# Patient Record
Sex: MALE | Race: WHITE | NOT HISPANIC OR LATINO | Employment: FULL TIME | URBAN - METROPOLITAN AREA
[De-identification: names, ages, dates, MRNs, and addresses within clinical notes are randomized per-mention and may not be internally consistent; named-entity substitution may affect disease eponyms.]

---

## 2024-05-13 ENCOUNTER — HOSPITAL ENCOUNTER (EMERGENCY)
Facility: HOSPITAL | Age: 49
Discharge: HOME | End: 2024-05-13
Attending: EMERGENCY MEDICINE | Admitting: EMERGENCY MEDICINE
Payer: COMMERCIAL

## 2024-05-13 ENCOUNTER — APPOINTMENT (OUTPATIENT)
Dept: GENERAL RADIOLOGY | Facility: HOSPITAL | Age: 49
End: 2024-05-13
Payer: COMMERCIAL

## 2024-05-13 VITALS
DIASTOLIC BLOOD PRESSURE: 76 MMHG | BODY MASS INDEX: 25.21 KG/M2 | HEART RATE: 68 BPM | RESPIRATION RATE: 16 BRPM | HEIGHT: 76 IN | WEIGHT: 207 LBS | TEMPERATURE: 98.1 F | OXYGEN SATURATION: 98 % | SYSTOLIC BLOOD PRESSURE: 133 MMHG

## 2024-05-13 DIAGNOSIS — S62.347A CLOSED NONDISPLACED FRACTURE OF BASE OF FIFTH METACARPAL BONE OF LEFT HAND, INITIAL ENCOUNTER: Primary | ICD-10-CM

## 2024-05-13 PROCEDURE — 99283 EMERGENCY DEPT VISIT LOW MDM: CPT

## 2024-05-13 PROCEDURE — 73120 X-RAY EXAM OF HAND: CPT

## 2024-05-13 RX ORDER — HYDROCODONE BITARTRATE AND ACETAMINOPHEN 7.5; 325 MG/1; MG/1
1 TABLET ORAL EVERY 4 HOURS PRN
Qty: 15 TABLET | Refills: 0 | Status: SHIPPED | OUTPATIENT
Start: 2024-05-13

## 2024-05-13 RX ORDER — OXYCODONE HYDROCHLORIDE AND ACETAMINOPHEN 5; 325 MG/1; MG/1
1 TABLET ORAL ONCE
Status: COMPLETED | OUTPATIENT
Start: 2024-05-13 | End: 2024-05-13

## 2024-05-13 RX ADMIN — OXYCODONE HYDROCHLORIDE AND ACETAMINOPHEN 1 TABLET: 5; 325 TABLET ORAL at 20:16

## 2024-05-13 NOTE — Clinical Note
[facility] EMERGENCY ROOM  [address] [place] [address]  Phone: [phone]  Fax: [phone]    Baron Moody was seen and treated in our emergency department on 5/13/2024.  He may return to work on 05/16/2024.         Thank you for choosing [facility].    Cris Shirley APRN

## 2024-05-14 ENCOUNTER — TELEPHONE (OUTPATIENT)
Dept: ORTHOPEDIC SURGERY | Facility: CLINIC | Age: 49
End: 2024-05-14
Payer: COMMERCIAL

## 2024-05-14 NOTE — ED PROVIDER NOTES
Time: 8:06 PM EDT  Date of encounter:  5/13/2024  Independent Historian/Clinical History and Information was obtained by:   Patient    History is limited by: N/A    Chief Complaint: LEFT 5TH FINGER INJURY      History of Present Illness:    The patient presents to the emergency department and states that on February 10 he was seen at [facility] after he states that he punched a telephone pole with his left hand.  He states that he is complaining of pain in his fifth metatarsal area and had negative x-ray at [facility] and was told to have it x-rayed again in 7 to 10 days.  He is here today for repeat x-ray of his left hand.  He states that the pain is continued but the swelling has gotten better.  He does have a Ortho-Glass splint in place.  He is neurovascular intact.  He can flex and extend the fingers but does report increased pain with movement of the fifth finger.  He denies any previous injury and states that he is right-handed.      History provided by:  Patient   used: No        Patient Care Team  Primary Care Provider: Provider, No Known    Past Medical History:     No Known Allergies  History reviewed. No pertinent past medical history.  History reviewed. No pertinent surgical history.  History reviewed. No pertinent family history.    Home Medications:  Prior to Admission medications    Not on File        Social History:   Social History     Tobacco Use    Smoking status: Every Day     Current packs/day: 1.00     Types: Cigarettes    Smokeless tobacco: Never   Vaping Use    Vaping status: Never Used   Substance Use Topics    Drug use: Defer         Review of Systems:  Review of Systems   Constitutional:  Negative for chills and fever.   HENT:  Negative for congestion, ear pain and sore throat.    Eyes:  Negative for pain.   Respiratory:  Negative for cough, chest tightness and shortness of breath.    Cardiovascular:  Negative for chest pain.   Gastrointestinal:  Negative for  "abdominal pain, diarrhea, nausea and vomiting.   Genitourinary:  Negative for flank pain and hematuria.   Musculoskeletal:  Positive for arthralgias, joint swelling and myalgias. Negative for back pain, gait problem, neck pain and neck stiffness.   Skin:  Negative for pallor and rash.   Neurological:  Negative for seizures and headaches.   All other systems reviewed and are negative.       Physical Exam:  /76 (BP Location: Right arm, Patient Position: Sitting)   Pulse 68   Temp 98.1 °F (36.7 °C) (Oral)   Resp 16   Ht 193 cm (76\")   Wt 93.9 kg (207 lb)   SpO2 98%   BMI 25.20 kg/m²     Physical Exam  Vitals and nursing note reviewed.   Constitutional:       General: He is not in acute distress.     Appearance: Normal appearance. He is not ill-appearing or toxic-appearing.   HENT:      Head: Normocephalic and atraumatic.      Mouth/Throat:      Mouth: Mucous membranes are moist.   Eyes:      General: No scleral icterus.     Pupils: Pupils are equal, round, and reactive to light.   Cardiovascular:      Rate and Rhythm: Normal rate and regular rhythm.      Pulses: Normal pulses.   Pulmonary:      Effort: Pulmonary effort is normal. No respiratory distress.   Abdominal:      General: Abdomen is flat.   Musculoskeletal:         General: Swelling, tenderness and signs of injury present. Normal range of motion.      Cervical back: Normal range of motion.   Skin:     General: Skin is warm and dry.      Capillary Refill: Capillary refill takes less than 2 seconds.      Findings: No erythema or rash.   Neurological:      General: No focal deficit present.      Mental Status: He is alert and oriented to person, place, and time. Mental status is at baseline.   Psychiatric:         Mood and Affect: Mood normal.         Behavior: Behavior normal.                Procedures:  Procedures      Medical Decision Making:      Comorbidities that affect care:    None    External Notes reviewed:    None      The following orders " were placed and all results were independently analyzed by me:  Orders Placed This Encounter   Procedures    XR Hand 2 View Left    Trim cast padding to comfort  Nursing Communication    Obtain & Apply The Following- Upper extremity; Sling       Medications Given in the Emergency Department:  Medications   oxyCODONE-acetaminophen (PERCOCET) 5-325 MG per tablet 1 tablet (1 tablet Oral Given 5/13/24 2016)        ED Course:    ED Course as of 05/14/24 0612   Mon May 13, 2024   1923 XR Hand 3+ View Left  Order: [id number]  Impression    Normal x-ray examination of the hand.    Electronically Signed:  David Dang MD  2024/05/10 at 16:37 CDT  Reading Location ID and State: 994 / [place]  Tel [phone], Service support  [phone], Fax [phone]  Narrative      REPORT-ID:[id number]    STUDY:   X-RAY - LEFT HAND    REASON FOR EXAM:   Male, 48 years old.  HAND INJURY; PUNCHED A TELEPHONE POLE, LEFT HAND PAIN    TECHNIQUE:   3 view(s) of the hand.    COMPARISON:   None.    FINDINGS:  Normal radiocarpal articulation.  Normal distal radioulnar joint.    Normal visualized carpal bones.  Normal carpal articulations    Normal carpometacarpal articulation of the thumb.  Normal second through fifth carpometacarpal joints.    Normal metacarpi.    Normal metacarpophalangeal joint of the thumb.  Normal interphalangeal joint of the thumb.  Normal proximal and distal phalanges of the thumb.    Normal metacarpophalangeal joints of the second through fifth fingers.  Normal proximal and distal interphalangeal joints of the second through fifth fingers.  Normal phalanges of the second through fifth fingers.    The soft tissue structures are unremarkable.  ___________________________________  Exam End: 05/10/24 17:30 Last Resulted: 05/10/24 17:37  Received From: [facility]  Result Received: 05/13/24 18:57      [TC]      ED Course User Index  [TC] Cris Shirley APRN       Labs:    Lab Results (last 24  hours)       ** No results found for the last 24 hours. **             Imaging:    XR Hand 2 View Left    Result Date: 5/13/2024  XR HAND 2 VW LEFT-  Date of Exam: 5/13/2024 6:51 PM  Indication: injury-punched a telephone pole  Comparison: None available.  Findings: Cast material is in place. There is a nondisplaced fracture involving the proximal shaft of the fifth metacarpal. The radiocarpal joint is congruent. The joint spaces appear well-maintained. The soft tissues are unremarkable.      Impression: Nondisplaced fracture involving proximal shaft of fifth metacarpal.   Electronically Signed By-Baron Harding MD On:5/13/2024 7:31 PM         Differential Diagnosis and Discussion:    Extremity Pain: Differential diagnosis includes but is not limited to soft tissue sprain, tendonitis, tendon injury, dislocation, fracture, deep vein thrombosis, arterial insufficiency, osteoarthritis, bursitis, and ligamentous damage.  Joint Pain: Differential diagnosis includes but is not limited to polyarticular arthritis, gout, tendinitis, hemarthrosis, septic arthritis, rheumatoid arthritis, bursitis, degenerative joint disease, joint effusion, autoimmune disorder, trauma, and occult neoplasm.  Orthopedic Injuries: Differential diagnosis includes but is not limited to fractures, soft tissue injuries, dislocations, contusions, ligamentous injuries, tendon injuries, nerve injuries, compartment syndrome, bursitis, and vascular injuries.    All X-rays impressions were independently interpreted by me.    MDM  Number of Diagnoses or Management Options  Closed nondisplaced fracture of base of fifth metacarpal bone of left hand, initial encounter: established and worsening     Amount and/or Complexity of Data Reviewed  Tests in the radiology section of CPT®: reviewed    Risk of Complications, Morbidity, and/or Mortality  Presenting problems: low  Diagnostic procedures: low  Management options: low    Patient Progress  Patient progress:  stable         Patient Care Considerations:    ANTIBIOTICS: I considered prescribing antibiotics as an outpatient however no bacterial focus of infection was found.      Consultants/Shared Management Plan:    None    Social Determinants of Health:    Patient is independent, reliable, and has access to care.       Disposition and Care Coordination:    Discharged: The patient is suitable and stable for discharge with no need for consideration of admission.    I have explained the patient´s condition, diagnoses and treatment plan based on the information available to me at this time. I have answered questions and addressed any concerns. The patient has a good  understanding of the patient´s diagnosis, condition, and treatment plan as can be expected at this point. The vital signs have been stable. The patient´s condition is stable and appropriate for discharge from the emergency department.      The patient will pursue further outpatient evaluation with the primary care physician or other designated or consulting physician as outlined in the discharge instructions. They are agreeable to this plan of care and follow-up instructions have been explained in detail. The patient has received these instructions in written format and has expressed an understanding of the discharge instructions. The patient is aware that any significant change in condition or worsening of symptoms should prompt an immediate return to this or the closest emergency department or call to 911.  I have explained discharge medications and the need for follow up with the patient/caretakers. This was also printed in the discharge instructions. Patient was discharged with the following medications and follow up:      Medication List        New Prescriptions      HYDROcodone-acetaminophen 7.5-325 MG per tablet  Commonly known as: NORCO  Take 1 tablet by mouth Every 4 (Four) Hours As Needed for Moderate Pain.               Where to Get Your Medications         These medications were sent to [id number] - [place], [place] - [address] - [phone]  - [phone] FX  [address] [address]      Phone: [phone]   HYDROcodone-acetaminophen 7.5-325 MG per tablet      Miguel Ash MD  [address] [place], [place] [id number] [phone]    Call   FOR FOLLOW UP       Final diagnoses:   Closed nondisplaced fracture of base of fifth metacarpal bone of left hand, initial encounter        ED Disposition       ED Disposition   Discharge    Condition   Stable    Comment   --               This medical record created using voice recognition software.             Cris Shirley, APRN  05/14/24 0612

## 2024-05-14 NOTE — DISCHARGE INSTRUCTIONS
Rest, ice, elevate. Take meds as prescribed. You may take OTC motrin with this medication as needed for pain. Wear your splint for stability, wear your sling for elevation. Call Dr Ash's office tomorrow and follow up with him in 1-2 days. Return to the ed for any increase in swelling, discoloration, or any new/worse concerns.

## 2024-05-20 ENCOUNTER — OFFICE VISIT (OUTPATIENT)
Dept: ORTHOPEDIC SURGERY | Facility: CLINIC | Age: 49
End: 2024-05-20
Payer: COMMERCIAL

## 2024-05-20 VITALS
HEIGHT: 76 IN | HEART RATE: 99 BPM | BODY MASS INDEX: 25.21 KG/M2 | OXYGEN SATURATION: 94 % | SYSTOLIC BLOOD PRESSURE: 121 MMHG | DIASTOLIC BLOOD PRESSURE: 80 MMHG | WEIGHT: 207 LBS

## 2024-05-20 DIAGNOSIS — S62.307A CLOSED NONDISPLACED FRACTURE OF FIFTH METACARPAL BONE OF LEFT HAND, UNSPECIFIED PORTION OF METACARPAL, INITIAL ENCOUNTER: Primary | ICD-10-CM

## 2024-05-20 NOTE — PROGRESS NOTES
"Chief Complaint  Pain and Initial Evaluation of the Left Hand    Subjective          Baron Moody presents to [facility] for an evaluation of his left hand.     History of Present Illness    The patient presents here today for an evaluation of his left hand. Patient was seen in the emergency room on 05/13/2024 where he had x-rays done. He reports he punched a telephone pole. He was placed in a splint from the ER and this was removed today in the office. He is left hand dominant.     No Known Allergies     Social History     Socioeconomic History    Marital status:    Tobacco Use    Smoking status: Every Day     Current packs/day: 1.00     Types: Cigarettes    Smokeless tobacco: Never   Vaping Use    Vaping status: Never Used   Substance and Sexual Activity    Drug use: Defer    Sexual activity: Defer        I reviewed the patient's chief complaint, history of present illness, review of systems, past medical history, surgical history, family history, social history, medications, and allergy list.     REVIEW OF SYSTEMS    Constitutional: Denies fevers, chills, weight loss  Cardiovascular: Denies chest pain, shortness of breath  Skin: Denies rashes, acute skin changes  Neurologic: Denies headache, loss of consciousness  MSK: left hand pain       Objective   Vital Signs:   /80   Pulse 99   Ht 193 cm (76\")   Wt 93.9 kg (207 lb)   SpO2 94%   BMI 25.20 kg/m²     Body mass index is 25.2 kg/m².    Physical Exam    General: Alert. No acute distress.   Left upper extremity:  short arm splint was removed, tender over the base of the 5th, healing abrasion, no rotational deformity, neurovascularly intact     Orthopedic Injury Treatment    Date/Time: 5/20/2024 2:25 PM    Performed by: Miguel Ash MD  Authorized by: Miguel Ash MD  Injury location: hand  Location details: left hand  Immobilization: cast  Splint type: ulnar gutter  Supplies used: cotton padding " (fiberglass)  Patient tolerance: patient tolerated the procedure well with no immediate complications  Comments: Closed treatment was obtained and fiberglass cast was applied.  The patient tolerated the procedure without any complications.  Daiana L        Imaging Results (Most Recent)       None                     Assessment and Plan        XR Hand 2 View Left    Result Date: 5/13/2024  Narrative: XR HAND 2 VW LEFT-  Date of Exam: 5/13/2024 6:51 PM  Indication: injury-punched a telephone pole  Comparison: None available.  Findings: Cast material is in place. There is a nondisplaced fracture involving the proximal shaft of the fifth metacarpal. The radiocarpal joint is congruent. The joint spaces appear well-maintained. The soft tissues are unremarkable.      Impression: Impression: Nondisplaced fracture involving proximal shaft of fifth metacarpal.   Electronically Signed By-Baron Harding MD On:5/13/2024 7:31 PM      XR Hand 3+ View Left    Result Date: 5/10/2024  Narrative: REPORT-ID:[id number] STUDY:   X-RAY - LEFT HAND REASON FOR EXAM:   Male, 48 years old.  HAND INJURY; PUNCHED A TELEPHONE POLE, LEFT HAND PAIN TECHNIQUE:   3 view(s) of the hand. COMPARISON:   None. FINDINGS: Normal radiocarpal articulation.  Normal distal radioulnar joint. Normal visualized carpal bones.  Normal carpal articulations Normal carpometacarpal articulation of the thumb.  Normal second through fifth carpometacarpal joints. Normal metacarpi. Normal metacarpophalangeal joint of the thumb.  Normal interphalangeal joint of the thumb.  Normal proximal and distal phalanges of the thumb. Normal metacarpophalangeal joints of the second through fifth fingers.  Normal proximal and distal interphalangeal joints of the second through fifth fingers.  Normal phalanges of the second through fifth fingers. The soft tissue structures are unremarkable. ___________________________________    Impression: Normal x-ray examination of  the hand. Electronically Signed: David Dang MD 2024/05/10 at 16:37 CDT Reading Location ID and State: 994 / [place] Tel [phone], Service support  [phone], Fax [phone]      Diagnoses and all orders for this visit:    1. Closed nondisplaced fracture of fifth metacarpal bone of left hand, unspecified portion of metacarpal, initial encounter (Primary)        The patient presents here today for an evaluation of his left hand. Xrays that were obtained in the emergency room were reviewed with the patient.     Discussed placing the patient in an ulnar gutter cast today. Cast care was discussed with the patient. Patient expressed understanding and wishes to proceed.      Work note provided.       Will obtain X-Rays of right hand at next visit.     Educated on risk of smoking/nicotine. Discussed options for smoking cessation. and Call or return if worsening symptoms.    Scribed for Miguel Ash MD by Sallie Nixon  05/20/2024   14:08 EDT         Follow Up       1 week     Patient was given instructions and counseling regarding his condition or for health maintenance advice. Please see specific information pulled into the AVS if appropriate.       I have personally performed the services described in this document as scribed by the above individual and it is both accurate and complete. Miguel Ash MD 05/20/24 16:50 EDT

## 2024-05-29 ENCOUNTER — OFFICE VISIT (OUTPATIENT)
Dept: ORTHOPEDIC SURGERY | Facility: CLINIC | Age: 49
End: 2024-05-29
Payer: COMMERCIAL

## 2024-05-29 VITALS — WEIGHT: 207 LBS | HEIGHT: 76 IN | BODY MASS INDEX: 25.21 KG/M2

## 2024-05-29 DIAGNOSIS — M79.642 LEFT HAND PAIN: ICD-10-CM

## 2024-05-29 DIAGNOSIS — S62.307A CLOSED NONDISPLACED FRACTURE OF FIFTH METACARPAL BONE OF LEFT HAND, UNSPECIFIED PORTION OF METACARPAL, INITIAL ENCOUNTER: Primary | ICD-10-CM

## 2024-05-29 PROCEDURE — 99024 POSTOP FOLLOW-UP VISIT: CPT | Performed by: PHYSICIAN ASSISTANT

## 2024-05-29 NOTE — PROGRESS NOTES
"Chief Complaint  Follow-up of the Left Hand    Subjective          Baron Moody presents to [facility]   History of Present Illness    Baron Moody presents today for a follow-up of his left hand.  Patient has a left fifth metacarpal fracture that we are treating conservatively with initial injury 5/13/2024.  Today, patient reports no pain to his hand.  Denies complications with his cast.  Denies numbness or tingling to his fingers.      No Known Allergies     Social History     Socioeconomic History    Marital status:    Tobacco Use    Smoking status: Every Day     Current packs/day: 1.00     Types: Cigarettes    Smokeless tobacco: Never   Vaping Use    Vaping status: Never Used   Substance and Sexual Activity    Drug use: Defer    Sexual activity: Defer        I reviewed the patient's chief complaint, history of present illness, review of systems, past medical history, surgical history, family history, social history, medications, and allergy list.     REVIEW OF SYSTEMS    Constitutional: Denies fevers, chills, weight loss  Cardiovascular: Denies chest pain, shortness of breath  Skin: Denies rashes, acute skin changes  Neurologic: Denies headache, loss of consciousness  MSK: Left hand pain      Objective   Vital Signs:   Ht 193 cm (76\")   Wt 93.9 kg (207 lb)   BMI 25.20 kg/m²     Body mass index is 25.2 kg/m².    Physical Exam    General: Alert. No acute distress.   Left upper extremity: Ulnar gutter cast in place today.  Cast intact, clean, and dry.  Small abrasion to the thumb from cast wear with no active drainage or concerning signs for infection.  Finger range of motion intact.  Sensation intact to the fingers.  Elbow range of motion intact.  Less than 2-second capillary refill.    Procedures    Imaging Results (Most Recent)       Procedure Component Value Units Date/Time    XR Hand 2 View Left [[id number]] Resulted: 05/29/24 0958     Updated: 05/29/24 0958    " Narrative:      Indications: Follow-up left fifth metacarpal fracture    Views: AP and lateral left hand    Findings: Left fifth metacarpal base fracture is seen.  Fracture alignment   appears stable.  All joints appear reduced.  Casting material in place   today.    Comparative Data: Comparative data found and reviewed today.                   Assessment and Plan    Diagnoses and all orders for this visit:    1. Closed nondisplaced fracture of fifth metacarpal bone of left hand, unspecified portion of metacarpal, initial encounter (Primary)    2. Left hand pain  -     XR Hand 2 View Left        Baron Moody presents today to follow-up his left fifth metacarpal fracture that we are treating conservatively in an ulnar gutter cast.  Initial injury 5/13/2024.  X-rays reviewed with the patient today.  Moleskin was applied to the cast today.  Patient will continue ulnar gutter cast for a total of 4 weeks.  Use ice and elevation as needed for inflammation.  Work note written today.      Patient will follow up in 3 weeks for reevaluation.  We will obtain new x-rays of the left hand without cast at next visit.      Call or return if symptoms worsen or patient has any concerns.       Follow Up   Return in about 3 weeks (around 6/19/2024).  Patient was given instructions and counseling regarding his condition or for health maintenance advice. Please see specific information pulled into the AVS if appropriate.     Johanna Leo PA-C  05/29/24  10:01 EDT